# Patient Record
Sex: FEMALE | Race: WHITE | NOT HISPANIC OR LATINO | Employment: UNEMPLOYED | URBAN - NONMETROPOLITAN AREA
[De-identification: names, ages, dates, MRNs, and addresses within clinical notes are randomized per-mention and may not be internally consistent; named-entity substitution may affect disease eponyms.]

---

## 2018-10-23 ENCOUNTER — OUTSIDE FACILITY SERVICE (OUTPATIENT)
Dept: ORTHOPEDIC SURGERY | Facility: CLINIC | Age: 73
End: 2018-10-23

## 2018-10-23 PROCEDURE — 99221 1ST HOSP IP/OBS SF/LOW 40: CPT | Performed by: ORTHOPAEDIC SURGERY

## 2018-10-23 PROCEDURE — 27130 TOTAL HIP ARTHROPLASTY: CPT | Performed by: ORTHOPAEDIC SURGERY

## 2018-11-16 ENCOUNTER — OFFICE VISIT (OUTPATIENT)
Dept: ORTHOPEDIC SURGERY | Facility: CLINIC | Age: 73
End: 2018-11-16

## 2018-11-16 DIAGNOSIS — Z96.642 H/O TOTAL HIP ARTHROPLASTY, LEFT: Primary | ICD-10-CM

## 2018-11-16 PROCEDURE — 73502 X-RAY EXAM HIP UNI 2-3 VIEWS: CPT | Performed by: ORTHOPAEDIC SURGERY

## 2018-11-16 PROCEDURE — 99024 POSTOP FOLLOW-UP VISIT: CPT | Performed by: ORTHOPAEDIC SURGERY

## 2018-11-16 NOTE — PROGRESS NOTES
Chief Complaint   Patient presents with   • Left Hip - Follow-up   • Follow-up     LEFT HIP ORIF (10/23/18)   Patient is here today following up on her left hip replacement.      HPI the patient underwent hip replacement surgery on the left side performed by me through an anterior approach for a femur neck fracture.  She had her surgery on 23 October 2018.  She is doing quite well in terms of postoperative rehabilitation.  She is not using any narcotics for pain control at this point.  There is no limb length discrepancy reported by the patient.  Her Refill is 2 seconds with a brisk return.  There are no fevers, rigors or chills.  Overall she is very pleased with her outcome following the trauma and the surgery to repair the fracture.  She is wanting to starting to drive now and is also requesting a permission to return back to her job because she needs her insurance and the paycheck.        There were no vitals filed for this visit.        Joint/Body Part Specific Exam:left hip. Patient is post op 3.5 week(s) from total hip arthoplasty, direct anterior. Incision is clean and healing well. Calf is soft and nontender. Homans sign is negative. Skin and soft tissues are appropriate for postoperative status of the patient. Hip flexion is 0-60° degrees, hip abduction is 0-to 30° degrees. No limb lenth discrepancy. Neurovascular status is intact. Patients gait is significantly improved. The pain relief is extremely dramatic for the patient. Dorsalis pedis and posterior tibial artery pulses palpable. Common peroneal function is well preserved. There is no evidence of cellulitis or erythema or deep seated joint infection.        X-RAY REPORT:  left Hip X-Ray  Indication: Evaluation of implant position after total hip arthroplasty  AP and lateral  Findings: Well placed total hip arthroplasty with a good press-fit profile without any subsidence of the implants  no bony lesion  Soft tissues within normal limits  within normal  limits joint spaces  Hardware appropriately positioned yes      yes prior studies available for comparison.    X-RAY was ordered and reviewed by Garett Lerner MD          Julianne was seen today for follow-up and follow-up.    Diagnoses and all orders for this visit:    H/O total hip arthroplasty, left  -     XR Hip With or Without Pelvis 2 - 3 View Left            Procedures        Instructions:      Plan: Incision care.    Falls precaution.    Decubitus precaution.    Follow the total hip arthroplasty protocol with physical therapy.    Okay for the patient to start driving at this point.    She is on xarelto for DVT prophylaxis.    She wants to return back to work on 26 November 2018.  She works in a [employer] and will be mostly in a sedentary position sitting at a desk.    Dislocation precautions.    Follow-up in my office in 8 weeks for reevaluation.

## 2018-11-27 PROBLEM — Z96.642 H/O TOTAL HIP ARTHROPLASTY, LEFT: Status: ACTIVE | Noted: 2018-11-27

## 2019-01-10 ENCOUNTER — OFFICE VISIT (OUTPATIENT)
Dept: ORTHOPEDIC SURGERY | Facility: CLINIC | Age: 74
End: 2019-01-10

## 2019-01-10 DIAGNOSIS — M81.0 OSTEOPOROSIS, UNSPECIFIED OSTEOPOROSIS TYPE, UNSPECIFIED PATHOLOGICAL FRACTURE PRESENCE: ICD-10-CM

## 2019-01-10 DIAGNOSIS — Z96.642 HISTORY OF LEFT HIP REPLACEMENT: Primary | ICD-10-CM

## 2019-01-10 PROCEDURE — 73502 X-RAY EXAM HIP UNI 2-3 VIEWS: CPT | Performed by: ORTHOPAEDIC SURGERY

## 2019-01-10 PROCEDURE — 99024 POSTOP FOLLOW-UP VISIT: CPT | Performed by: ORTHOPAEDIC SURGERY

## 2019-01-10 NOTE — PROGRESS NOTES
Chief Complaint   Patient presents with   • Left Hip - Follow-up   Patient is here today following up on a left total hip replacement.      HPI the patient had fallen and injured her left hip.  She underwent left anterior hip replacement on 23 October 2018.  She is doing extremely well postoperatively.  She does not have any limb length discrepancy.  Her pain is fairly well controlled.  She is not using any narcotics for management of her pain.  She is neurovascularly intact.  She is ambulating without the assistance of a cane.  She has done extremely well with postoperative physical therapy.  The patient states that she would like a couple more weeks off work so that she can fully recuperate from her injury and then returned back to work on 1 February.  We are going to go ahead and schedule a DEXA scan for this patient for evaluation of the osteoporosis.        There were no vitals filed for this visit.        Joint/Body Part Specific Exam:left hip. Patient is post op 2.5 month(s) from total hip arthoplasty, direct anterior. Incision is clean and healing well. Calf is soft and nontender. Homans sign is negative. Skin and soft tissues are appropriate for postoperative status of the patient. Hip flexion is 0-80 degrees, hip abduction is 0-30 degrees. No limb lenth discrepancy. Neurovascular status is intact. Patients gait is significantly improved. The pain relief is extremely dramatic for the patient. Dorsalis pedis and posterior tibial artery pulses palpable. Common peroneal function is well preserved. There is no evidence of cellulitis or erythema or deep seated joint infection.        X-RAY REPORT:        Julianne was seen today for follow-up.    Diagnoses and all orders for this visit:    History of left hip replacement  -     XR Hip With or Without Pelvis 2 - 3 View Left            Procedures        Instructions:      Plan: Weightbearing as tolerated.    Stretching and strengthening exercises.    Use a cane for  assistance with ambulation.    Full discussion regarding osteoporosis with the patient today in the office.    My recommendations are to go ahead and obtain a DEXA scan in the next few weeks so that we can appropriately address her issues with the osteoporosis.    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    No restrictions are being placed on the patient at this point.    Falls precaution.    Tablet Meloxicam 7.5 mg tab 1 by mouth daily at bedtime for pain swelling and discomfort.    Follow-up in my office in 1 year for joint surveillance.

## 2019-01-13 PROBLEM — M81.0 OSTEOPOROSIS: Status: ACTIVE | Noted: 2019-01-13

## 2019-01-13 PROBLEM — Z96.642 HISTORY OF LEFT HIP REPLACEMENT: Status: ACTIVE | Noted: 2019-01-13

## 2019-01-28 ENCOUNTER — TELEPHONE (OUTPATIENT)
Dept: ORTHOPEDIC SURGERY | Facility: CLINIC | Age: 74
End: 2019-01-28

## 2019-01-28 NOTE — TELEPHONE ENCOUNTER
----- Message from Garett Lerner MD sent at 1/25/2019  1:32 PM EST -----  His call this patient regarding her DEXA scan report.  She has borderline osteoporosis and I would recommend starting her on Boniva or Fosamax.  She does need yearly checks on her DEXA scan so as well to see the direction in which the bone mineral density is progressing  ----- Message -----  From: Interface, Scans Incoming  Sent: 1/25/2019  10:39 AM  To: Garett Lerner MD

## 2019-01-29 NOTE — TELEPHONE ENCOUNTER
Spoke with patient regarding DEXA results and treatment. She would like me to send everything to her in a written form so she can review and make a more informed decision regarding treatment.

## 2020-03-09 ENCOUNTER — OFFICE VISIT (OUTPATIENT)
Dept: ORTHOPEDIC SURGERY | Facility: CLINIC | Age: 75
End: 2020-03-09

## 2020-03-09 ENCOUNTER — TELEPHONE (OUTPATIENT)
Dept: ORTHOPEDIC SURGERY | Facility: CLINIC | Age: 75
End: 2020-03-09

## 2020-03-09 VITALS — WEIGHT: 135.8 LBS | HEIGHT: 63 IN | TEMPERATURE: 98.3 F | BODY MASS INDEX: 24.06 KG/M2

## 2020-03-09 DIAGNOSIS — Z96.642 H/O TOTAL HIP ARTHROPLASTY, LEFT: Primary | ICD-10-CM

## 2020-03-09 PROCEDURE — 99213 OFFICE O/P EST LOW 20 MIN: CPT | Performed by: ORTHOPAEDIC SURGERY

## 2020-03-09 RX ORDER — PROMETHAZINE HYDROCHLORIDE AND CODEINE PHOSPHATE 6.25; 1 MG/5ML; MG/5ML
SYRUP ORAL
COMMUNITY
Start: 2019-12-11 | End: 2021-03-11

## 2020-03-09 RX ORDER — PREDNISONE 50 MG/1
TABLET ORAL
COMMUNITY
Start: 2019-12-11 | End: 2021-03-11

## 2020-03-09 RX ORDER — LEVOFLOXACIN 500 MG/1
TABLET, FILM COATED ORAL
COMMUNITY
Start: 2019-12-11 | End: 2021-03-11

## 2020-03-09 RX ORDER — AMOXICILLIN 500 MG/1
500 CAPSULE ORAL SEE ADMIN INSTRUCTIONS
Qty: 2 CAPSULE | Refills: 0 | Status: SHIPPED | OUTPATIENT
Start: 2020-03-09 | End: 2021-03-11

## 2020-03-09 RX ORDER — ALBUTEROL SULFATE 90 UG/1
AEROSOL, METERED RESPIRATORY (INHALATION)
COMMUNITY
Start: 2020-02-27 | End: 2021-03-11

## 2020-03-09 NOTE — PROGRESS NOTES
"FOLLOW UP VISIT    Patient: Julianne Israel  ?  YOB: 1945    MRN: [id number]  ?  Chief Complaint   Patient presents with   • Left Hip - Pain, Follow-up      ?  HPI: 1 YEAR FU L THR  Ms. Israel,Julianne CONNELLY follows up on her left total hip arthroplasty.  I did perform the surgery a year and a half ago.  She has done extremely well postoperatively.  She does not have any pain that requires narcotic medication to control her symptoms.  She does not have a limb length discrepancy.  Her neurovascular status is intact. She is able to walk for exercise and is very pleased with her postoperative outcome.  She states \"I have no issues with my new hip \".    Pain Location: left hip  Radiation: none  Quality: dull  Intensity/Severity: none  Duration: 1.5 years  Previous Treatment: Left total hip arthroplasty in October 2018.    This patient is an established patient.  This problem is not new to this examiner.      Allergies: No Known Allergies    Medications:   Home Medications:  Current Outpatient Medications on File Prior to Visit   Medication Sig   • albuterol sulfate  (90 Base) MCG/ACT inhaler INHALE 2 PUFFS PO EVERY 2 HOURS   • levoFLOXacin (LEVAQUIN) 500 MG tablet    • predniSONE (DELTASONE) 50 MG tablet    • promethazine-codeine (PHENERGAN with CODEINE) 6.25-10 MG/5ML syrup      No current facility-administered medications on file prior to visit.      Current Medications:  Scheduled Meds:  PRN Meds:.    I have reviewed the patient's medical history in detail and updated the computerized patient record.  Review and summarization of old records include:    History reviewed. No pertinent past medical history.  Past Surgical History:   Procedure Laterality Date   • HIP SURGERY       Social History     Occupational History   • Not on file   Tobacco Use   • Smoking status: Never Smoker   • Smokeless tobacco: Never Used   Substance and Sexual Activity   • Alcohol use: Defer   • Drug use: Defer   • Sexual activity: " "Not on file      Family History   Problem Relation Age of Onset   • Severe sprains Neg Hx          Review of Systems   Constitutional: Negative.    HENT: Negative.    Eyes: Negative.    Respiratory: Negative.    Cardiovascular: Negative.    Gastrointestinal: Negative.    Endocrine: Negative.    Genitourinary: Negative.    Musculoskeletal: Positive for arthralgias and gait problem.   Skin: Negative.    Allergic/Immunologic: Negative.    Hematological: Negative.    Psychiatric/Behavioral: Negative.           Wt Readings from Last 3 Encounters:   03/09/20 61.6 kg (135 lb 12.8 oz)     Ht Readings from Last 3 Encounters:   03/09/20 160 cm (63\")     Body mass index is 24.06 kg/m².  Facility age limit for growth percentiles is 20 years.  Vitals:    03/09/20 0935   Temp: 98.3 °F (36.8 °C)         Physical Exam  Constitutional: Patient is oriented to person, place, and time. Appears well-developed and well-nourished.   HENT:   Head: Normocephalic and atraumatic.   Eyes: Conjunctivae and EOM are normal. Pupils are equal, round, and reactive to light.   Cardiovascular: Normal rate, regular rhythm, normal heart sounds and intact distal pulses.   Pulmonary/Chest: Effort normal and breath sounds normal.   Musculoskeletal:   See detailed exam below   Neurological: Alert and oriented to person, place, and time. No sensory deficit. Coordination normal.   Skin: Skin is warm and dry. Capillary refill takes less than 2 seconds. No rash noted. No erythema.   Psychiatric: Patient has a normal mood and affect. Her behavior is normal. Judgment and thought content normal.   Nursing note and vitals reviewed.      Ortho Exam:   Left hip. Patient is post op 1.5 year(s) from total hip arthoplasty, direct anterior. Incision is clean and healing well. Calf is soft and nontender. Homans sign is negative. Skin and soft tissues are appropriate for postoperative status of the patient. Hip flexion is 0-90 degrees, hip abduction is 0-30 degrees. No " limb lenth discrepancy. Neurovascular status is intact. Patients gait is significantly improved. The pain relief is extremely dramatic for the patient. Dorsalis pedis and posterior tibial artery pulses palpable. Common peroneal function is well preserved. There is no evidence of cellulitis or erythema or deep seated joint infection.      Diagnostics:  no diagnostic testing performed this visit      Assessment:  Julianne was seen today for pain and follow-up.    Diagnoses and all orders for this visit:    H/O total hip arthroplasty, left          Procedures  ?    Plan    · Falls precautions and dislocation precautions for the hip arthroplasty.  · Calcium and vitamin D for bone health.  · , GI and dental procedure prophylaxis with antibiotics to prevent metastatic infection to the hip arthroplasty implants.  · Glucosamine, chondroitin and turmeric for cartilage health.  · Rest, ice, compression, and elevation (RICE) therapy  · Stretching and strengthening exercises  · Tylenol 500-1000mg by mouth every 6 hours as needed for pain   · Follow up in 1 year(s)    Date of encounter: 03/09/2020   Garett Lerner MD

## 2021-03-10 DIAGNOSIS — Z96.642 H/O TOTAL HIP ARTHROPLASTY, LEFT: Primary | ICD-10-CM

## 2021-03-11 ENCOUNTER — OFFICE VISIT (OUTPATIENT)
Dept: ORTHOPEDIC SURGERY | Facility: CLINIC | Age: 76
End: 2021-03-11

## 2021-03-11 ENCOUNTER — HOSPITAL ENCOUNTER (OUTPATIENT)
Dept: OTHER | Facility: HOSPITAL | Age: 76
Discharge: HOME | End: 2021-03-11
Attending: ORTHOPAEDIC SURGERY

## 2021-03-11 VITALS — WEIGHT: 135 LBS | BODY MASS INDEX: 23.92 KG/M2 | TEMPERATURE: 97.7 F | HEIGHT: 63 IN

## 2021-03-11 DIAGNOSIS — Z96.642 H/O TOTAL HIP ARTHROPLASTY, LEFT: Primary | ICD-10-CM

## 2021-03-11 PROCEDURE — 99212 OFFICE O/P EST SF 10 MIN: CPT | Performed by: ORTHOPAEDIC SURGERY

## 2021-03-11 RX ORDER — LACTULOSE 10 G/15ML
SOLUTION ORAL
COMMUNITY
Start: 2021-01-07

## 2021-03-11 NOTE — PROGRESS NOTES
"Chief Complaint  Follow-up of the Left Hip    Subjective    History of Present Illness      Julianne Israel is a 76 y.o. female who presents to [facility] for follow-up on a left total hip arthroplasty.  History of Present Illness the patient is doing extremely well and is following up routinely for left total hip arthroplasty.  I had performed the surgery through an anterior approach on 23 October 2018.  She is now 2-1/2 years out and has regained all of her mobility.  The preoperative pain has completely settled down.  She is very pleased with her outcome.  She states that her range of motion is near normal and she has even forgotten which side she had the surgery.  She states that \"I have no trouble at all with my hip replacement surgery.  She states that her surgery has been a blessing in terms of improved mobility and improved quality of life.  Pain Location: LEFT hip  Radiation: none  Quality: dull  Intensity/Severity: none  Duration: 2.5 years  Progression of symptoms: None in fact her symptoms have completely resolved.  Previous Treatment: prior surgery       Objective   Vital Signs:   Temp 97.7 °F (36.5 °C)   Ht 160 cm (63\")   Wt 61.2 kg (135 lb)   BMI 23.91 kg/m²     Physical Exam  Physical Exam  Vitals signs and nursing note reviewed.   Constitutional:       Appearance: Normal appearance.   Pulmonary:      Effort: Pulmonary effort is normal.   Skin:     General: Skin is warm and dry.      Capillary Refill: Capillary refill takes less than 2 seconds.   Neurological:      General: No focal deficit present.      Mental Status: He is alert and oriented to person, place, and time. Mental status is at baseline.   Psychiatric:         Mood and Affect: Mood normal.         Behavior: Behavior normal.         Thought Content: Thought content normal.         Judgment: Judgment normal.     Ortho Exam     Left hip. Patient is post op 2.5 year(s) from total hip arthoplasty, direct " anterior. Incision is clean and healing well. Calf is soft and nontender. Homans sign is negative. Skin and soft tissues are appropriate for postoperative status of the patient. Hip flexion is 0-90 degrees, hip abduction is 0-30 degrees. No limb lenth discrepancy. Neurovascular status is intact. Patients gait is significantly improved. The pain relief is extremely dramatic for the patient. Dorsalis pedis and posterior tibial artery pulses palpable. Common peroneal function is well preserved. There is no evidence of cellulitis or erythema or deep seated joint infection.          Result Review :   The following data was reviewed by: Garett Lerner MD on 03/11/2021:    xrays obtained today  left Hip X-Ray  Indication: Evaluation of implant position after total hip arthroplasty.  AP and lateral  Findings: Well-placed implants are good press-fit profile without any settling of the implants.  no bony lesion  Soft tissues within normal limits  within normal limits joint spaces  Hardware appropriately positioned yes      yes prior studies available for comparison.    X-RAY was ordered and reviewed by Garett Lerner MD              Procedures           Assessment   Assessment and Plan    Diagnoses and all orders for this visit:    1. H/O total hip arthroplasty, left (Primary)          Follow Up   · Use a cane for assistance with ambulation and to prevent falls.  · Falls and dislocation precautions.  · , GI and dental procedure prophylaxis with antibiotics to prevent metastatic infection of the hip arthroplasty implants.  · Calcium and vitamin D for bone health.  · Rest, ice, compression, and elevation (RICE) therapy  · Stretching and strengthening exercises of the hip flexors and abductors.  · OTC Tylenol 500-1000mg by mouth every 6 hours as needed for pain   · Follow up in 1 year(s)  • Patient was given instructions and counseling regarding her condition or for health maintenance advice. Please see specific information  pulled into the AVS if appropriate.     Garett Lerner MD   Date of Encounter: 3/11/2021        EMR Dragon/Transcription disclaimer:  Much of this encounter note is an electronic transcription/translation of spoken language to printed text. The electronic translation of spoken language may permit erroneous, or at times, nonsensical words or phrases to be inadvertently transcribed; Although I have reviewed the note for such errors, some may still exist.